# Patient Record
Sex: MALE | Race: WHITE | NOT HISPANIC OR LATINO | Employment: STUDENT | URBAN - METROPOLITAN AREA
[De-identification: names, ages, dates, MRNs, and addresses within clinical notes are randomized per-mention and may not be internally consistent; named-entity substitution may affect disease eponyms.]

---

## 2020-08-05 PROBLEM — Z71.3 DIETARY COUNSELING: Status: ACTIVE | Noted: 2020-08-05

## 2020-08-05 PROBLEM — Q90.9 DOWN'S SYNDROME: Status: ACTIVE | Noted: 2020-08-05

## 2020-08-05 PROBLEM — Z71.82 EXERCISE COUNSELING: Status: ACTIVE | Noted: 2020-08-05

## 2021-05-06 ENCOUNTER — PATIENT MESSAGE (OUTPATIENT)
Dept: RESEARCH | Facility: HOSPITAL | Age: 21
End: 2021-05-06

## 2021-12-08 PROBLEM — H61.309 ACQUIRED STENOSIS OF EXTERNAL EAR CANAL: Status: ACTIVE | Noted: 2020-11-18

## 2022-03-30 ENCOUNTER — OFFICE VISIT (OUTPATIENT)
Dept: PODIATRY | Facility: CLINIC | Age: 22
End: 2022-03-30
Payer: MEDICAID

## 2022-03-30 VITALS — BODY MASS INDEX: 32.37 KG/M2 | HEIGHT: 60 IN | WEIGHT: 164.88 LBS

## 2022-03-30 DIAGNOSIS — L60.3 NAIL DYSTROPHY: Primary | ICD-10-CM

## 2022-03-30 DIAGNOSIS — B35.3 TINEA PEDIS OF BOTH FEET: ICD-10-CM

## 2022-03-30 PROCEDURE — 1160F RVW MEDS BY RX/DR IN RCRD: CPT | Mod: CPTII,,, | Performed by: STUDENT IN AN ORGANIZED HEALTH CARE EDUCATION/TRAINING PROGRAM

## 2022-03-30 PROCEDURE — 1160F PR REVIEW ALL MEDS BY PRESCRIBER/CLIN PHARMACIST DOCUMENTED: ICD-10-PCS | Mod: CPTII,,, | Performed by: STUDENT IN AN ORGANIZED HEALTH CARE EDUCATION/TRAINING PROGRAM

## 2022-03-30 PROCEDURE — 1159F MED LIST DOCD IN RCRD: CPT | Mod: CPTII,,, | Performed by: STUDENT IN AN ORGANIZED HEALTH CARE EDUCATION/TRAINING PROGRAM

## 2022-03-30 PROCEDURE — 88304 PR  SURG PATH,LEVEL III: ICD-10-PCS | Mod: 26,,, | Performed by: PATHOLOGY

## 2022-03-30 PROCEDURE — 88312 SPECIAL STAINS GROUP 1: CPT | Performed by: PATHOLOGY

## 2022-03-30 PROCEDURE — 3008F BODY MASS INDEX DOCD: CPT | Mod: CPTII,,, | Performed by: STUDENT IN AN ORGANIZED HEALTH CARE EDUCATION/TRAINING PROGRAM

## 2022-03-30 PROCEDURE — 87107 FUNGI IDENTIFICATION MOLD: CPT | Performed by: STUDENT IN AN ORGANIZED HEALTH CARE EDUCATION/TRAINING PROGRAM

## 2022-03-30 PROCEDURE — 87101 SKIN FUNGI CULTURE: CPT | Performed by: STUDENT IN AN ORGANIZED HEALTH CARE EDUCATION/TRAINING PROGRAM

## 2022-03-30 PROCEDURE — 99203 PR OFFICE/OUTPT VISIT, NEW, LEVL III, 30-44 MIN: ICD-10-PCS | Mod: S$PBB,,, | Performed by: STUDENT IN AN ORGANIZED HEALTH CARE EDUCATION/TRAINING PROGRAM

## 2022-03-30 PROCEDURE — 88304 TISSUE EXAM BY PATHOLOGIST: CPT | Mod: 26,,, | Performed by: PATHOLOGY

## 2022-03-30 PROCEDURE — 1159F PR MEDICATION LIST DOCUMENTED IN MEDICAL RECORD: ICD-10-PCS | Mod: CPTII,,, | Performed by: STUDENT IN AN ORGANIZED HEALTH CARE EDUCATION/TRAINING PROGRAM

## 2022-03-30 PROCEDURE — 88312 SPECIAL STAINS GROUP 1: CPT | Mod: 26,,, | Performed by: PATHOLOGY

## 2022-03-30 PROCEDURE — 99212 OFFICE O/P EST SF 10 MIN: CPT | Mod: PBBFAC,PN | Performed by: STUDENT IN AN ORGANIZED HEALTH CARE EDUCATION/TRAINING PROGRAM

## 2022-03-30 PROCEDURE — 88312 PR  SPECIAL STAINS,GROUP I: ICD-10-PCS | Mod: 26,,, | Performed by: PATHOLOGY

## 2022-03-30 PROCEDURE — 99999 PR PBB SHADOW E&M-EST. PATIENT-LVL II: ICD-10-PCS | Mod: PBBFAC,,, | Performed by: STUDENT IN AN ORGANIZED HEALTH CARE EDUCATION/TRAINING PROGRAM

## 2022-03-30 PROCEDURE — 99203 OFFICE O/P NEW LOW 30 MIN: CPT | Mod: S$PBB,,, | Performed by: STUDENT IN AN ORGANIZED HEALTH CARE EDUCATION/TRAINING PROGRAM

## 2022-03-30 PROCEDURE — 99999 PR PBB SHADOW E&M-EST. PATIENT-LVL II: CPT | Mod: PBBFAC,,, | Performed by: STUDENT IN AN ORGANIZED HEALTH CARE EDUCATION/TRAINING PROGRAM

## 2022-03-30 PROCEDURE — 88304 TISSUE EXAM BY PATHOLOGIST: CPT | Performed by: PATHOLOGY

## 2022-03-30 PROCEDURE — 3008F PR BODY MASS INDEX (BMI) DOCUMENTED: ICD-10-PCS | Mod: CPTII,,, | Performed by: STUDENT IN AN ORGANIZED HEALTH CARE EDUCATION/TRAINING PROGRAM

## 2022-03-30 RX ORDER — CLOTRIMAZOLE AND BETAMETHASONE DIPROPIONATE 10; .64 MG/G; MG/G
CREAM TOPICAL 2 TIMES DAILY
Qty: 45 G | Refills: 10 | Status: SHIPPED | OUTPATIENT
Start: 2022-03-30

## 2022-03-30 NOTE — PROGRESS NOTES
Subjective:      Patient ID: Chris Hutson is a 21 y.o. male.    Chief Complaint: Nail Problem    Chris is a 21 y.o. male who presents to the clinic complaining of thick and discolored toenails on both feet.Patient also has dryness, scaling and pain to the bottom of his both feet. Chris is inquiring about treatment options.    Patient is here today with his caregiver.  He has no medical power of  with him.  Part of the counseling was performed using cell phone communication with his mother.      Review of Systems   Skin: Positive for dry skin and nail changes.        Scaling skin   All other systems reviewed and are negative.          Objective:      Physical Exam  Skin:     Comments: Plantar foot with diffuse scaling plaques.  This distribution throughout the entire plantar foot extending to the medial aspect of the mid arch.  There is mild erythema throughout the plantar foot.    Several of his nails are thickened and dystrophic.  Bilateral 4th toes.               Assessment:       Encounter Diagnosis   Name Primary?    Nail dystrophy Yes         Plan:       Chris was seen today for nail problem.    Diagnoses and all orders for this visit:    Nail dystrophy  -     Fungal culture , skin, hair, or nails  -     Specimen to Pathology Dermatology and skin neoplasms    Other orders  -     clotrimazole-betamethasone 1-0.05% (LOTRISONE) cream; Apply topically 2 (two) times daily.      I counseled the patient on his conditions, their implications and medical management.    Antifungal cream ordered for the plantar aspect of his feet.  He needs to use a for at least 6 weeks once or twice a day.    Samples taken of the nails.  We will update the patient on results to see whether not they want to proceed with oral versus topical treatment.  I explained the risk and complications of each.    Patient to follow-up p.r.n..    Cuate Michel DPM

## 2022-04-08 LAB
FINAL PATHOLOGIC DIAGNOSIS: NORMAL
GROSS: NORMAL
Lab: NORMAL
MICROSCOPIC EXAM: NORMAL

## 2022-04-13 ENCOUNTER — PATIENT MESSAGE (OUTPATIENT)
Dept: PODIATRY | Facility: CLINIC | Age: 22
End: 2022-04-13
Payer: MEDICAID

## 2022-05-02 LAB — FUNGUS BLD CULT: ABNORMAL

## 2022-11-02 ENCOUNTER — TELEPHONE (OUTPATIENT)
Dept: PODIATRY | Facility: CLINIC | Age: 22
End: 2022-11-02
Payer: MEDICAID

## 2022-11-02 NOTE — TELEPHONE ENCOUNTER
----- Message from Erika Solo sent at 11/2/2022 11:29 AM CDT -----  Contact: [phone]  Type: Needs Medical Advice  Who Called:  Wolf Pardo Call Back Number: [phone]    Additional Information: Wolf is pts caregiver and she is calling about a medication for pts last visit in March. She stated pt had a sample of his toenail taken to be tested and they never heard back with results or medication. Pls call back and advise

## 2023-01-15 PROBLEM — B35.3 TINEA PEDIS OF BOTH FEET: Status: ACTIVE | Noted: 2023-01-15
